# Patient Record
(demographics unavailable — no encounter records)

---

## 2024-11-06 NOTE — HISTORY OF PRESENT ILLNESS
[FreeTextEntry1] : Patient presents for skin examination. [de-identified] : Denies new, changing, bleeding or tender lesions on the skin over the past 6 months.

## 2024-11-06 NOTE — PHYSICAL EXAM
[Alert] : alert [Oriented x 3] : ~L oriented x 3 [Well Nourished] : well nourished [Full Body Skin Exam Performed] : performed [FreeTextEntry3] : A full skin exam was performed including the scalp, face (including lips, ears, nose and eyes), neck, chest, abdomen, back, buttocks, upper extremities and lower extremities.  The patient declined examination of the genitalia.   The exam revealed the following benign growths: [place] pigmented nevi. Seborrheic keratoses. Lentigines. Cherry angioma.  keratotic papules - right cheek and left forehead.

## 2025-04-15 NOTE — PLAN
ALTERED LOC/awake
[TextEntry] : will follow with patient at wound center; see in office once discharged from wound center 
[TextEntry] : will follow with patient at wound center; see in office once discharged from wound center

## 2025-04-15 NOTE — ASSESSMENT
[FreeTextEntry1] : 72 year male s/p L BKA with TMR (3/25/25); patient is experiencing phantom limb pain however he states this has improved. L stump incision is well approximated; some staples removed in office today- will remove the rest at wound center. Deep tissue injury noted inferior to his patella this will be managed at wound center also. He is due to meet with [facility] prosthetics again next week.

## 2025-04-15 NOTE — PHYSICAL EXAM
[2+] : left 2+ [Ankle Swelling On The Right] : of the right ankle [Alert] : alert [Oriented to Person] : oriented to person [Oriented to Place] : oriented to place [Oriented to Time] : oriented to time [Calm] : calm [Ankle Swelling (On Exam)] : not present [de-identified] : ambulating via wheelchair  [FreeTextEntry1] : L stump incision well approximated with staples; no surrounding drainage, erythema or edema Skin tissue pressure injury inferior to patella

## 2025-04-15 NOTE — HISTORY OF PRESENT ILLNESS
[FreeTextEntry1] : 72 year male with past medical history significant for chronic osteomyelitis of left foot, prostate cancer, anemia, DM2, HTN, CKD, Gout, basal call cancer, CAD, and HLD, initially presented to  for left foot infection; now s/p L BKA with target muscle reinnervation (3/25/25). Patient presents with spouse, states he feels well. He endorses phantom limb pain however states this has slowly been improving. He endorses mild incisional soreness. Denies fever/chills. He does complain of a "wound" above the stump incision below his patella that he believes has been present since the surgery. States he is to be set up with his prosthesis in about one week.

## 2025-04-15 NOTE — PHYSICAL EXAM
[2+] : left 2+ [Ankle Swelling On The Right] : of the right ankle [Alert] : alert [Oriented to Person] : oriented to person [Oriented to Place] : oriented to place [Oriented to Time] : oriented to time [Calm] : calm [Ankle Swelling (On Exam)] : not present [de-identified] : ambulating via wheelchair  [FreeTextEntry1] : L stump incision well approximated with staples; no surrounding drainage, erythema or edema Skin tissue pressure injury inferior to patella

## 2025-04-15 NOTE — ASSESSMENT
[FreeTextEntry1] : 72 year male s/p L BKA with TMR (3/25/25); patient is experiencing phantom limb pain however he states this has improved. L stump incision is well approximated; some staples removed in office today- will remove the rest at wound center. Deep tissue injury noted inferior to his patella this will be managed at wound center also. He is due to meet with [facility] prosthetics again next week.

## 2025-04-28 NOTE — PHYSICAL EXAM
[Alert] : alert [Oriented to Person] : oriented to person [Oriented to Place] : oriented to place [Oriented to Time] : oriented to time [de-identified] : ambulating via wheelchair  [FreeTextEntry1] : left BKA stump well approximated; no surrounding erythema no edema  left circular deep tissue injury posterior patella has decreased in size

## 2025-04-28 NOTE — ASSESSMENT
[FreeTextEntry1] : 72 year male s/p L BKA with TMR (3/25/25); L stump incision is well approximated well healed; remaining staples removed in office today. Deep tissue injury superior to patella has decreased in size. He is due to meet with [facility] prosthetics this week due to cancelled appointment last week. Still experiencing intermittent phantom pain otherwise offers no complaints. Has VNS to the house.

## 2025-04-28 NOTE — ASSESSMENT
[FreeTextEntry1] : 72 year male s/p L BKA with TMR (3/25/25); L stump incision is well approximated well healed; remaining staples removed in office today. Deep tissue injury superior to patella has decreased in size. He is due to meet with [facility] [place] prost[facility] this week due to cancelled appointment last week. Still experiencing intermittent phantom pain otherwise offers no complaints. Has VNS to the house.

## 2025-04-28 NOTE — HISTORY OF PRESENT ILLNESS
[FreeTextEntry1] : 72 year male with past medical history significant for chronic osteomyelitis of left foot, prostate cancer, anemia, DM2, HTN, CKD, Gout, basal call cancer, CAD, and HLD, initially presented to  for left foot infection; now s/p L BKA with target muscle reinnervation (3/25/25). Patient presents with spouse, states he feels well. He endorses phantom limb pain however states this has slowly been improving. He endorses mild incisional soreness. Denies fever/chills. He does complain of a "wound" above the stump incision below his patella that he believes has been present since the surgery. States he is to be set up with his prosthesis in about one week.  [de-identified] : Presenting today as patient was never able to be seen at wound center due to long wait times. He overall feels well states the pressure injury above his knee has decreased in size. He had to cancel his appointment with  prosthetics as the remaining staples were never removed at wound center. Still endorsing phantom limb pain otherwise feels well.

## 2025-04-28 NOTE — PHYSICAL EXAM
[Alert] : alert [Oriented to Person] : oriented to person [Oriented to Place] : oriented to place [Oriented to Time] : oriented to time [de-identified] : ambulating via wheelchair  [FreeTextEntry1] : left BKA stump well approximated; no surrounding erythema no edema  left circular deep tissue injury posterior patella has decreased in size

## 2025-04-28 NOTE — HISTORY OF PRESENT ILLNESS
[FreeTextEntry1] : 72 year male with past medical history significant for chronic osteomyelitis of left foot, prostate cancer, anemia, DM2, HTN, CKD, Gout, basal call cancer, CAD, and HLD, initially presented to  for left foot infection; now s/p L BKA with target muscle reinnervation (3/25/25). Patient presents with spouse, states he feels well. He endorses phantom limb pain however states this has slowly been improving. He endorses mild incisional soreness. Denies fever/chills. He does complain of a "wound" above the stump incision below his patella that he believes has been present since the surgery. States he is to be set up with his prosthesis in about one week.  [de-identified] : Presenting today as patient was never able to be seen at wound center due to long wait times. He overall feels well states the pressure injury above his knee has decreased in size. He had to cancel his appointment with  prosthetics as the remaining staples were never removed at wound center. Still endorsing phantom limb pain otherwise feels well.

## 2025-04-28 NOTE — PHYSICAL EXAM
[Alert] : alert [Oriented to Person] : oriented to person [Oriented to Place] : oriented to place [Oriented to Time] : oriented to time [de-identified] : ambulating via wheelchair  [FreeTextEntry1] : left BKA stump well approximated; no surrounding erythema no edema  left circular deep tissue injury posterior patella has decreased in size

## 2025-04-28 NOTE — ASSESSMENT
[FreeTextEntry1] : 72 year male s/p L BKA with TMR (3/25/25); L stump incision is well approximated well healed; remaining staples removed in office today. Deep tissue injury superior to patella has decreased in size. He is due to meet with [facility] prosthetics this week due to cancelled appointment last week. Still experiencing intermittent phantom pain otherwise offers no complaints. Has VNS to the house.

## 2025-04-28 NOTE — HISTORY OF PRESENT ILLNESS
[FreeTextEntry1] : 72 year male with past medical history significant for chronic osteomyelitis of left foot, prostate cancer, anemia, DM2, HTN, CKD, Gout, basal call cancer, CAD, and HLD, initially presented to  for left foot infection; now s/p L BKA with target muscle reinnervation (3/25/25). Patient presents with spouse, states he feels well. He endorses phantom limb pain however states this has slowly been improving. He endorses mild incisional soreness. Denies fever/chills. He does complain of a "wound" above the stump incision below his patella that he believes has been present since the surgery. States he is to be set up with his prosthesis in about one week.  [de-identified] : Presenting today as patient was never able to be seen at wound center due to long wait times. He overall feels well states the pressure injury above his knee has decreased in size. He had to cancel his appointment with  prosthetics as the remaining staples were never removed at wound center. Still endorsing phantom limb pain otherwise feels well.

## 2025-06-04 NOTE — ASSESSMENT
[FreeTextEntry1] : Lentigines, cherry angioma Benign. hats and sunscreens encouraged. f/u as scheduled for [facility].

## 2025-06-04 NOTE — HISTORY OF PRESENT ILLNESS
[FreeTextEntry1] : He is c/o lesion of the left cheek. [de-identified] : Rough, and growing.  No bleeding.

## 2025-06-04 NOTE — PHYSICAL EXAM
[Alert] : alert [Oriented x 3] : ~L oriented x 3 [Well Nourished] : well nourished [FreeTextEntry3] : keratotic papules, left malar x 2, nasal bridge and right cheek.  Lentigines, face. Cherry angioma - face.